# Patient Record
(demographics unavailable — no encounter records)

---

## 2025-02-14 NOTE — PLAN
[FreeTextEntry1] : Pelvic US to evaluate uterus and ovaries HSG ordered to confirm tubal patency  follow up for annual/pap smear and discussion on results

## 2025-02-14 NOTE — HISTORY OF PRESENT ILLNESS
[FreeTextEntry1] : 34 yo in for discussion on infertility   x 1   Has not been able to become pregnant since then   Reports monthly, regular menses Seen by gyn in [facility] and no abnormalities noted Reports PCOs? on prior US  Denies pelvic surgeries Denies PMHx  Spouse seeing urologist 25 for evaluation and work up  Denies tobacco or drug use   Exercises daily

## 2025-03-14 NOTE — PHYSICAL EXAM
[Chaperone Present] : A chaperone was present in the examining room during all aspects of the physical examination [FreeTextEntry2] : Emani [Appropriately responsive] : appropriately responsive [Alert] : alert [No Acute Distress] : no acute distress [No Lymphadenopathy] : no lymphadenopathy [Regular Rate Rhythm] : regular rate rhythm [No Murmurs] : no murmurs [Clear to Auscultation B/L] : clear to auscultation bilaterally [Soft] : soft [Non-tender] : non-tender [Non-distended] : non-distended [No HSM] : No HSM [No Lesions] : no lesions [No Mass] : no mass [Oriented x3] : oriented x3 [Examination Of The Breasts] : a normal appearance [] : implants [No Masses] : no breast masses were palpable [Labia Majora] : normal [Labia Minora] : normal [No Bleeding] : There was no active vaginal bleeding [Normal] : normal [Uterine Adnexae] : normal [Declined] : Patient declined rectal exam

## 2025-03-14 NOTE — HISTORY OF PRESENT ILLNESS
[FreeTextEntry1] : 34 yo  in for annual and follow up after Pelvic US  Patient desires pregnancy   Unable to conceive after    Patient reports menses are monthly.  Denies severe pelvic pain.  Pelvic US 3/7/25 demonstrated normal UT  AV  and both ovaries with multiple follicles  PCOs picture otherwise, unremarkable  Patient Denies breast pain   Bilateral Implants for augmentation Denies nipple dc  Denies severe abdominal or pelvic pain   Denies vaginal discharge

## 2025-03-14 NOTE — PLAN
[FreeTextEntry1] : thin prep with hr hpv and gc/ct vaginitis panel  Patient to set up HSG    Order reprinted  follow up gyn after HSG completed and Spouse has completed semen analysis

## 2025-05-09 NOTE — HISTORY OF PRESENT ILLNESS
[FreeTextEntry1] : 36 yo in for follow up on ureaplasma and chlamydia  Patient has HSG and right tube appears obstructed   Left tube pacifies, but there is no definite free spill.  She desires pregnancy  we will refer her to [facility] for further evaluation and management  gc/ct and vaginitis panel today  Patient denies pelvic pain or fever/chills, nausea/emesis, dyspnea, or dizziness.

## 2025-05-09 NOTE — PHYSICAL EXAM
[MA] : MA [Appropriately responsive] : appropriately responsive [Alert] : alert [No Acute Distress] : no acute distress [No Lymphadenopathy] : no lymphadenopathy [Regular Rate Rhythm] : regular rate rhythm [No Murmurs] : no murmurs [Clear to Auscultation B/L] : clear to auscultation bilaterally [Soft] : soft [Non-tender] : non-tender [Non-distended] : non-distended [No HSM] : No HSM [No Lesions] : no lesions [No Mass] : no mass [Oriented x3] : oriented x3 [Labia Majora] : normal [Labia Minora] : normal [Normal] : normal [Uterine Adnexae] : normal [FreeTextEntry2] : Melody